# Patient Record
Sex: FEMALE | Race: WHITE | NOT HISPANIC OR LATINO | URBAN - METROPOLITAN AREA
[De-identification: names, ages, dates, MRNs, and addresses within clinical notes are randomized per-mention and may not be internally consistent; named-entity substitution may affect disease eponyms.]

---

## 2018-07-18 ENCOUNTER — TELEPHONE (OUTPATIENT)
Dept: TRANSPLANT | Facility: CLINIC | Age: 58
End: 2018-07-18

## 2018-07-18 DIAGNOSIS — Z00.5 TRANSPLANT DONOR EVALUATION: Primary | ICD-10-CM

## 2018-07-18 NOTE — TELEPHONE ENCOUNTER
Unknown abo.Not sure PEP. Hx Hyst-had Pap 1 yr ago. Colonoscopy last yr.Will send Phase 1 orders/pkt.

## 2018-07-18 NOTE — TELEPHONE ENCOUNTER
"[facility] B[facility]  [id number]      LIVING KIDNEY DONOR EVALUATION  Donor First Name Janis Donor MRN    Donor Last Name Inlay Completed 2018 12:27 PM    1960 Record ID [id number]   BREEZE Screen PASSED     Intended Recipient  Recipient First Name Meg Recipient [id number]    Recipient Last Name Parveen Relationship Close Friend   Recipient   Recipient Diagnosis    Recipient's ABO      Donor Information  Age 57 Gender Female   Ht 160 cm (5' 3'') Race    Wt 50.8 kg (112 lbs) Ethnicity Not /   BMI 19.90 kg/m  Preferred Language English      Required No     Blood Type Unknown   Demographics  Home Address [facility] [address] [facility] # [id number]   [facility] Type Mobile   State [place] Alternate # [phone]   Zip Code [address] Type Mobile   Country [place] Preferred Contact day Tue   Email  Preferred Contact time 09:00 AM-11:00 AM   Donor's Medical Information  Medical History \"Indigestion\"   Anxiety d/o NOS   GERD   Osteoporosis Medications Valium   Vitamin D   Surgical History Appendectomy   Biopsy (Colon and/or Rectum)   Colonoscopy   GI Surgery, NOS   Gastric Surgery, NOS   Hysterectomy and Oophorectomy   Polypectomy (Colon)   Surgery of the Appendix, NOS Allergies Penicillin : Rash   Social History EtOH: Denies   Illicit Drug Use: Denies   Tobacco: Denies Self-Reported Functional Status \"I am able to participate in moderate recreational activities like golf, double tennis, dancing, throwing a baseball or football\"   Family Medical History Cancer (Mother, Grandparent)   Diabetes (denies)   Heart Disease (denies)   Hypertension (Mother, Father)   Kidney Disease (denies)   Kidney Stones (denies) Exercise Frequency Exercise (3 X per week)   Review of Organ Systems  Review of Systems Airway or Lungs: No   Blood Disorder: No   Cancer: No   Diabetes,Thyroid,Adrenal,Endocrine Disorder: No   Digestive or Liver: Yes   Female Health: No   Heart or Circulatory System: " No   Immune Diseases: No   Kidneys and Bladder: No   Muscles,Bones,Joints: Yes   Neuro: No   Psych: Yes   Donor's Social Information  Marital Status  Living Accommodation Owns own home/apartment   Level of Education Some college education Living Arrangement With spouse   Employment Status Unemployed Concerns: health and life insurance No   Employer  Concerns: job security and lost income No   Occupation      Medical Insurance Status Has medical insurance     High Risk Behavior  High Risk Behaviors Blood transfusion < 12 months. (NO)   Commercial sex < 12 months. (NO)   Illicit IV drug use < 5yrs. (NO)   Other high risk sexual contact < 12 months. (NO)   Reason for Donation  Referral Friend or Family of Tx Candidate Reason for Donation I love Ingrid   Permission to Disclose Inquiry Yes Patient Comments I had a resection intestines at age 11. Due to peritonitis. I have had ulcers in stomach and duodenum polyps but fine now. Had endoscopy 2016 and colonoscopy 2017. All good.   Donor Motivation Level Highly motivated donor     PCP Contact  PCP Name [facility]   PCP [facility]   PCP Phone [phone]   Emergency Contact  First Name Adolfo First Name Magalys   Last Name Inlsachin Last Name Kjerstad   Phone # [phone] Phone # [phone]   Phone Type Mobile Phone Type Mobile   Relationship Spouse Relationship Child   Office Use  Reviewed By    Reviewed 6/19/2018 8:54 AM   Admin Folder Accept   Comments 6/19 - Passed Eval   Lost for Followup    Extended Comments    [facility] ID [id number] survey status completed   Activity History  Call  Task    Due Date 7/16/2018   Last Modified Date/Time 7/16/2018 10:26 AM   Comments    Call  Task    Due Date 6/19/2018   Last Modified Date/Time 6/19/2018 10:20 AM   Comments    Call  Task    Due Date 6/19/2018   Last Modified Date/Time 6/19/2018 10:08 AM   Comments

## 2018-08-02 ENCOUNTER — DOCUMENTATION ONLY (OUTPATIENT)
Dept: TRANSPLANT | Facility: CLINIC | Age: 58
End: 2018-08-02

## 2018-08-06 ENCOUNTER — DOCUMENTATION ONLY (OUTPATIENT)
Dept: TRANSPLANT | Facility: CLINIC | Age: 58
End: 2018-08-06

## 2018-09-12 ENCOUNTER — COMMITTEE REVIEW (OUTPATIENT)
Dept: TRANSPLANT | Facility: CLINIC | Age: 58
End: 2018-09-12

## 2018-09-12 NOTE — COMMITTEE REVIEW
Abdominal Committee Review Note     Evaluation Date:   Committee Review Date: 9/12/2018    Organ being evaluated for: Kidney    Transplant Phase:   Transplant Status:     Transplant Coordinat  Nae Villalobos  Transplant Surgeon:       Referring Physician: Referred Self    Primary Diagnosis:   Secondary Diagnosis:     Committee Review Members:  Sugey Ovalle, RD   Pharmacy [facility], [facility]    - Clinical Ivette Abrams, [facility], Anusha Landaverde, [facility]   Transplant Jeanne Mac, GAUTAM, Nae Villalobos, GAUTAM, Isabel Camarillo, GAUTAM, Yuli Banegas MD, Adolfo Nuñez MD       Transplant Eligibility: can come for eval    Committee Review Decision: Needs Re-presentation    Relative Contraindications: None    Absolute Contraindications: None    Committee Chair Yuli Bnaegas MD verbally attested to the committee's decision.    Committee Discussion Details: Dr. Nuñez reviewed Creatinine clearance.  Donor can come for eval.

## 2018-09-14 ENCOUNTER — TELEPHONE (OUTPATIENT)
Dept: TRANSPLANT | Facility: CLINIC | Age: 58
End: 2018-09-14

## 2018-09-14 NOTE — TELEPHONE ENCOUNTER
Reviewed CrCl results at donor meeting and Mohit felt it was OK to proceed. Informed Janis. Is unsure about PEP.Will now send swabs to [facility].

## 2018-09-21 ENCOUNTER — RESULTS ONLY (OUTPATIENT)
Dept: OTHER | Facility: CLINIC | Age: 58
End: 2018-09-21

## 2018-09-26 LAB
A* LOCUS NMDP: NORMAL
A* LOCUS: NORMAL
A* NMDP: NORMAL
ABTEST METHOD: NORMAL
B* LOCUS NMDP: NORMAL
B* LOCUS: NORMAL
B* NMDP: NORMAL
B*: NORMAL
BW-1: NORMAL
BW-2: NORMAL
C* LOCUS NMDP: NORMAL
C* LOCUS: NORMAL
C* NMDP: NORMAL
C*: NORMAL
DPA1* NMDP: NORMAL
DPA1*: NORMAL
DPB1* LOCUS NMDP: NORMAL
DPB1* NMDP: NORMAL
DPB1*: NORMAL
DPB1*LOCUS: NORMAL
DQA1*: NORMAL
DQA1*LOCUS NMDP: NORMAL
DQA1*LOCUS: NORMAL
DQA1*NMDP: NORMAL
DQB1* LOCUS NMDP: NORMAL
DQB1* LOCUS: NORMAL
DQB1* NMDP: NORMAL
DQB1*: NORMAL
DRB1* LOCUS NMDP: NORMAL
DRB1* LOCUS: NORMAL
DRB1* NMDP: NORMAL
DRB1*: NORMAL
DRB3* LOCUS NMDP: NORMAL
DRB3* LOCUS: NORMAL
DRB5* NMDP: NORMAL
DRB5*: NORMAL
DRSSO TEST METHOD: NORMAL

## 2018-09-27 ENCOUNTER — TELEPHONE (OUTPATIENT)
Dept: TRANSPLANT | Facility: CLINIC | Age: 58
End: 2018-09-27

## 2018-09-27 NOTE — TELEPHONE ENCOUNTER
Informed Janis of XM results. Sad she couldn't be direct donor. Will discuss PEP option with spouse and will contact us if interested.